# Patient Record
Sex: FEMALE | Race: WHITE | NOT HISPANIC OR LATINO | Employment: FULL TIME | URBAN - METROPOLITAN AREA
[De-identification: names, ages, dates, MRNs, and addresses within clinical notes are randomized per-mention and may not be internally consistent; named-entity substitution may affect disease eponyms.]

---

## 2023-02-28 ENCOUNTER — OCCUPATIONAL HEALTH (OUTPATIENT)
Dept: URGENT CARE | Facility: CLINIC | Age: 23
End: 2023-02-28
Payer: OTHER GOVERNMENT

## 2023-02-28 DIAGNOSIS — Z02.83 ENCOUNTER FOR DRUG SCREENING: Primary | ICD-10-CM

## 2023-02-28 LAB
CTP QC/QA: YES
POC 5 PANEL DRUG SCREEN: NEGATIVE

## 2023-02-28 PROCEDURE — 80305 POCT RAPID DRUG SCREEN 5 PANEL: ICD-10-PCS | Mod: S$GLB,,, | Performed by: PHYSICIAN ASSISTANT

## 2023-02-28 PROCEDURE — 80305 DRUG TEST PRSMV DIR OPT OBS: CPT | Mod: S$GLB,,, | Performed by: PHYSICIAN ASSISTANT

## 2023-08-15 ENCOUNTER — OFFICE VISIT (OUTPATIENT)
Dept: URGENT CARE | Facility: CLINIC | Age: 23
End: 2023-08-15
Payer: OTHER GOVERNMENT

## 2023-08-15 VITALS
OXYGEN SATURATION: 97 % | WEIGHT: 180 LBS | HEART RATE: 69 BPM | DIASTOLIC BLOOD PRESSURE: 69 MMHG | HEIGHT: 64 IN | RESPIRATION RATE: 20 BRPM | TEMPERATURE: 99 F | BODY MASS INDEX: 30.73 KG/M2 | SYSTOLIC BLOOD PRESSURE: 116 MMHG

## 2023-08-15 DIAGNOSIS — S93.402A SPRAIN OF LEFT ANKLE, UNSPECIFIED LIGAMENT, INITIAL ENCOUNTER: ICD-10-CM

## 2023-08-15 DIAGNOSIS — M25.572 ACUTE LEFT ANKLE PAIN: ICD-10-CM

## 2023-08-15 DIAGNOSIS — T14.90XA INJURY: Primary | ICD-10-CM

## 2023-08-15 PROCEDURE — 99203 PR OFFICE/OUTPT VISIT, NEW, LEVL III, 30-44 MIN: ICD-10-PCS | Mod: S$GLB,,,

## 2023-08-15 PROCEDURE — 73630 XR FOOT COMPLETE 3 VIEW LEFT: ICD-10-PCS | Mod: LT,S$GLB,, | Performed by: RADIOLOGY

## 2023-08-15 PROCEDURE — 99203 OFFICE O/P NEW LOW 30 MIN: CPT | Mod: S$GLB,,,

## 2023-08-15 PROCEDURE — 73630 X-RAY EXAM OF FOOT: CPT | Mod: LT,S$GLB,, | Performed by: RADIOLOGY

## 2023-08-15 RX ORDER — BUPROPION HYDROCHLORIDE 150 MG/1
150 TABLET, EXTENDED RELEASE ORAL 2 TIMES DAILY
COMMUNITY

## 2023-08-15 RX ORDER — NAPROXEN 500 MG/1
500 TABLET ORAL 2 TIMES DAILY
Qty: 28 TABLET | Refills: 0 | Status: SHIPPED | OUTPATIENT
Start: 2023-08-15 | End: 2023-08-29

## 2023-08-15 NOTE — PROGRESS NOTES
"Subjective:      Patient ID: Elaine Ash is a 23 y.o. female.    Vitals:  height is 5' 4" (1.626 m) and weight is 81.6 kg (180 lb). Her oral temperature is 98.8 °F (37.1 °C). Her blood pressure is 116/69 and her pulse is 69. Her respiration is 20 and oxygen saturation is 97%.     Chief Complaint: Foot Injury    Patient is here for left injury. Patient states wearing platform shoes, she tripped and fell on Friday.    Provider note:    24 yo F c/o left ankle pain following a fall that occurred while walking in heels this past Friday. She experienced pain and swelling that worsened the following day. She is applying ice and elevating as much as possible. Taking tylenol otc for pain relief. She is on her feet a lot at work and is unable to rest as much as she would like. She reports the pain and swelling was improving the last few days but worsened today. Denies numbness/tingling. Pain is worse with walking.     Foot Injury   The incident occurred 3 to 5 days ago. The incident occurred at the gym. The injury mechanism was a fall. The pain is present in the left foot. The pain is at a severity of 6/10. The pain is moderate. The pain has been Constant since onset. Pertinent negatives include no inability to bear weight, loss of motion, loss of sensation, muscle weakness, numbness or tingling. She reports no foreign bodies present. The symptoms are aggravated by movement and weight bearing. She has tried elevation and ice (ibuprofen) for the symptoms. The treatment provided mild relief.       Musculoskeletal:  Positive for pain, trauma, joint pain and joint swelling. Negative for abnormal ROM of joint.   Skin:  Negative for color change, erythema and bruising.   Neurological:  Negative for numbness.      Objective:     Physical Exam   Constitutional: normal  Abdominal: Normal appearance.   Musculoskeletal:         General: Swelling and tenderness present. No deformity or signs of injury.      Right ankle: " Normal.      Left ankle: She exhibits swelling. She exhibits normal range of motion, no ecchymosis, no deformity and no laceration. Tenderness.      Right lower leg: No edema.      Left lower leg: No edema.   Neurological: She is alert.   Skin: Skin is warm, dry, not pale and no rash. not left ankleNo bruising, No erythema and No lesion jaundice      Assessment:     1. Injury    2. Acute left ankle pain    3. Sprain of left ankle, unspecified ligament, initial encounter        Plan:   Ace wrap applied in clinic. Stressed RICE. Pt denied work note, cannot take off any days from work and no light duty allowed.       Please drink plenty of fluids.  Please get plenty of rest.  Please return here or go to the Emergency Department for any concerns or worsening of condition.  Rest, ice, compression and elevation to the affected joint or limb as needed.  Please follow up with your primary care doctor or specialist as needed.    If you  smoke, please stop smoking.        Injury  -     X-Ray Foot Complete 3 view Left; Future; Expected date: 08/15/2023    Acute left ankle pain  -     naproxen (NAPROSYN) 500 MG tablet; Take 1 tablet (500 mg total) by mouth 2 (two) times daily. for 14 days  Dispense: 28 tablet; Refill: 0    Sprain of left ankle, unspecified ligament, initial encounter        X-Ray Foot Complete 3 view Left    Result Date: 8/15/2023  EXAMINATION: XR FOOT COMPLETE 3 VIEW LEFT CLINICAL HISTORY: .  Injury, unspecified, initial encounter TECHNIQUE: AP, lateral and oblique views of the left foot were performed. COMPARISON: None FINDINGS: Three views left foot. No acute displaced fracture or dislocation of the foot.  No radiopaque foreign body.  No significant edema.     1. No acute displaced fracture or dislocation of the foot. Electronically signed by: Pawan Man MD Date:    08/15/2023 Time:    17:20